# Patient Record
Sex: MALE | Race: WHITE | Employment: UNEMPLOYED | URBAN - METROPOLITAN AREA
[De-identification: names, ages, dates, MRNs, and addresses within clinical notes are randomized per-mention and may not be internally consistent; named-entity substitution may affect disease eponyms.]

---

## 2020-12-29 ENCOUNTER — OFFICE VISIT (OUTPATIENT)
Dept: URGENT CARE | Facility: URGENT CARE | Age: 55
End: 2020-12-29
Payer: COMMERCIAL

## 2020-12-29 ENCOUNTER — ANCILLARY PROCEDURE (OUTPATIENT)
Dept: GENERAL RADIOLOGY | Facility: CLINIC | Age: 55
End: 2020-12-29
Attending: PHYSICIAN ASSISTANT
Payer: COMMERCIAL

## 2020-12-29 VITALS
TEMPERATURE: 98 F | WEIGHT: 180 LBS | OXYGEN SATURATION: 96 % | HEART RATE: 93 BPM | HEIGHT: 69 IN | BODY MASS INDEX: 26.66 KG/M2 | DIASTOLIC BLOOD PRESSURE: 95 MMHG | RESPIRATION RATE: 20 BRPM | SYSTOLIC BLOOD PRESSURE: 158 MMHG

## 2020-12-29 DIAGNOSIS — R06.02 SOB (SHORTNESS OF BREATH): Primary | ICD-10-CM

## 2020-12-29 DIAGNOSIS — R06.02 SOB (SHORTNESS OF BREATH): ICD-10-CM

## 2020-12-29 PROCEDURE — 99203 OFFICE O/P NEW LOW 30 MIN: CPT | Performed by: PHYSICIAN ASSISTANT

## 2020-12-29 PROCEDURE — 71046 X-RAY EXAM CHEST 2 VIEWS: CPT | Performed by: RADIOLOGY

## 2020-12-29 PROCEDURE — 93000 ELECTROCARDIOGRAM COMPLETE: CPT | Performed by: PHYSICIAN ASSISTANT

## 2020-12-29 ASSESSMENT — MIFFLIN-ST. JEOR: SCORE: 1651.85

## 2020-12-30 NOTE — PROGRESS NOTES
"S: Sukh De Leon is a 55 year old male who presents to urgent care with concerns of shortness of breath.  He states it has been present for 3 days. Discribes it as a feeling like he is not getting satisfying breaths. He states he wonders if he needs oxygen.   It seems to come and go.  There is no chest pain associated with this sensation.  It does not interfere with ADLS and not worsened with activity. No orthopnea, pnd, BALES.  NO wheezing.  No history of asthma or RAD.  No dizziness or lightheadedness.  NO fevers or chills.     No cough.  There is no pleuritic chest discomfort.  No recent immobilization or trauma, no history of DVT or hypercoagulability. NO leg swelling.   No personal history of cardiac disease, htn, hyperlipidemia, diabetes. He is somewhat concerned about Covid 19 pandemic. He does have a history of anxiety per pt report but has not had sensation of feeling of sob associated with that in the past.  He has a pcp at another clinic and has an appointment with him in 1 week.      Past Medical History:   Diagnosis Date     Diverticulitis      Current Outpatient Medications   Medication     Omeprazole (PRILOSEC PO)     No current facility-administered medications for this visit.       O: BP (!) 158/95 (BP Location: Right arm, Patient Position: Sitting, Cuff Size: Adult Regular)   Pulse 93   Temp 98  F (36.7  C) (Temporal)   Resp 20   Ht 1.753 m (5' 9\")   Wt 81.6 kg (180 lb)   SpO2 96%   BMI 26.58 kg/m    nad appears well   Able to talk in full sentences.    Lungs CTA  Heart RRR, no murmurs or rubs no JVD, HJR   Abdomen: BS Active, soft ,nondistended, nontender to light or deep palpation. No rebound or peritoneal signs.    No masses or hsm.    MM moist, skin turger good.      CXR: per my independent reading negative for acute process, no PTX, effusion, cardiomegaly or infiltrate.    ECG: NSR without ectopy, no st elevation or depression.      A/P:    Encounter Diagnosis   Name Primary?     SOB " (shortness of breath) Yes     Reassured pt of normal CXR and ECG.  Given duration of sx being 3 days and nonexertional with normal ECG and no pain/pressure/discomfort in the chest thus low likelihood of ACS.  Cardiac exam currently normal without obvious murmurs or rubs and he has no associated chest pain, less likely related to pericarditis, PE.  There is no associated edema orthopnea, sx not worse with exertion, nor weight change or cardiomegaly on CXR thus less likely related to heart failure.  Pt vitals are all in the normal range including respirations and O2 sats. He is quite comfortable with normal conversation without any over dyspnea.  I would recommend follow-up with his primary care provider in the next week for recheck. If worsening, develops chest pain, leg swelling, fevers, he should go to the ED immediately.  Pt agreeable with plan.

## 2021-02-11 ENCOUNTER — NURSE TRIAGE (OUTPATIENT)
Dept: NURSING | Facility: CLINIC | Age: 56
End: 2021-02-11

## 2021-02-11 NOTE — TELEPHONE ENCOUNTER
Needs information for S.S.I. the 29th of December. I answered his questions for his paperwork. He denies further questions at this time. He wanted to know diagnosis, tests performed, plan of care.  Michelle Wade RN  [facility]    Additional Information    Negative: Nursing judgment    Negative: Nursing judgment    Negative: Nursing judgment    Negative: Nursing judgment    Information only question and nurse able to answer    Protocols used: NO PROTOCOL AVAILABLE - INFORMATION ONLY-A-[place]

## 2022-01-23 ENCOUNTER — NURSE TRIAGE (OUTPATIENT)
Dept: NURSING | Facility: CLINIC | Age: 57
End: 2022-01-23
Payer: COMMERCIAL

## 2022-01-23 NOTE — TELEPHONE ENCOUNTER
"    Reason for Disposition    Pimple, not a boil    Additional Information    Negative: [1] Widespread rash AND [2] bright red, sunburn-like AND [3] too weak to stand    Negative: Sounds like a life-threatening emergency to the triager    Negative: Painful lump or swelling at opening to anus (rectum)    Negative: Painful lump or swelling at opening to vagina (on labia)    Negative: Painful lump or swelling on scrotum    Negative: Impetigo suspected or diagnosed    Negative: Doesn't match the SYMPTOMS of a boil    Negative: MRSA, questions about (No boil or other skin lesion)    Negative: Widespread red rash    Negative: Black (necrotic) color or blisters develop in wound    Negative: Patient sounds very sick or weak to the triager    Negative: SEVERE pain (e.g., excruciating)    Negative: Red streak from area of infection    Negative: Fever > 100.4 F (38.0 C)    Negative: Boil > 2 inches across (> 5 cm; larger than a golf ball or ping pong ball)    Negative: [1] Boil > 1/2 inch across (> 12 mm; larger than a marble) AND [2] center is soft or pus colored    Negative: [1] Boil > 1/4 inch across (> 6 mm; larger than a pencil eraser) AND [2] on face    Negative: [1] Boil AND [2] diabetes mellitus or weak immune system (e.g., HIV positive, cancer chemo, splenectomy, organ transplant, chronic steroids)    Negative: Boil overlying a joint    Negative: 2 or more boils    Negative: [1] Spreading redness around the boil AND [2] no fever    Negative: Boil > 1/2 inch across (> 12 mm; larger than a marble)    Negative: [1] Boil AND [2] not improved > 3 days following CARE ADVICE    Negative: Boils are a recurrent problem for this patient (patient with no boil now)    Negative: Boils are a recurrent problem for this family (patient with no boil now)    Negative: Boil < 1/2 inch across (< 12 mm; smaller than a marble)    Answer Assessment - Initial Assessment Questions  1. APPEARANCE of BOIL: \"What does the boil look like?\"       " "Group of pimples  2. LOCATION: \"Where is the boil located?\"       Under beard  3. NUMBER: \"How many boils are there?\"       2-3 pimples  4. SIZE: \"How big is the boil?\" (e.g., inches, cm; compare to size of a coin or other object)      small  5. ONSET: \"When did the boil start?\"      More than a week  6. PAIN: \"Is there any pain?\" If so, ask: \"How bad is the pain?\"   (Scale 1-10; or mild, moderate, severe)      mild  7. FEVER: \"Do you have a fever?\" If so, ask: \"What is it, how was it measured, and when did it start?\"       no  8. SOURCE: \"Have you been around anyone with boils or other Staph infections?\" \"Have you ever had boils before?\"      3 month beard growth  9. OTHER SYMPTOMS: \"Do you have any other symptoms?\" (e.g., shaking chills, weakness, rash elsewhere on body)      no  10. PREGNANCY: \"Is there any chance you are pregnant?\" \"When was your last menstrual period?\"        no    Protocols used: BOIL (SKIN ABSCESS)-A-AH      "

## 2022-02-25 ENCOUNTER — NURSE TRIAGE (OUTPATIENT)
Dept: NURSING | Facility: CLINIC | Age: 57
End: 2022-02-25
Payer: COMMERCIAL

## 2022-02-25 NOTE — TELEPHONE ENCOUNTER
"S-(situation): boil    B-(background): Pt had shingles on his face last month. States that it now looks like a scab.    A-(assessment):     Single boil on his back above his tailbone, size 1/4 inch. Dark purple in color, he states it used to have pus in it but per his , it looks \"dark and no whitish in the middle.\" This is present for 2-3 days.  No pain. States that it is not recurrent.  No fever. No red streak. No itching      R-(recommendations): home care. Advised to clean wound with soap and water BID, then apply OTC antibiotic ointment TID x 7 days. If boil appears to get worse, or if it spreads, schedule an appointment with PCP.    He also asks about shingles shot. FNA advised that as long as shingles rash have cleared up, can get shingles shot (per CDC)    Anni Ballesteros RN/[facility]          Reason for Disposition    Red tender bump or lump < 1/2 inch across (< 12 mm; smaller than a marble)    Additional Information    Negative: Widespread rash and bright red, sunburn-like and too weak to stand    Negative: Sounds like a life-threatening emergency to the triager    Negative: Widespread red rash    Negative: Black (necrotic) color or blisters develop in wound    Negative: Patient sounds very sick or weak to the triager    Negative: SEVERE pain (e.g., excruciating)    Negative: Red streak from area of infection    Negative: Fever > 100.4 F (38.0 C)    Negative: Boil > 2 inches across (> 5 cm; larger than a golf ball or ping pong ball)    Negative: Boil > 1/2 inch across (> 12 mm; larger than a marble) and center is soft or pus colored    Negative: Spreading redness around the boil and no fever    Negative: Boil and diabetes mellitus or weak immune system (e.g., HIV positive, cancer chemo, splenectomy, organ transplant, chronic steroids)    Negative: Boil > 1/4 inch across (> 6 mm; larger than a pencil eraser) and on face    Negative: Boil overlying a joint    Negative: 2 or more boils    " Negative: Patient wants to be seen    Negative: Boil > 1/2 inch across (> 12 mm; larger than a marble)    Negative: Boil and not improved > 3 days following CARE ADVICE    Negative: Boils are a recurrent problem for this patient (patient with no boil now)    Negative: Boils are a recurrent problem for this family (patient with no boil now)    Protocols used: BOIL (SKIN ABSCESS)-A-OH

## 2023-05-20 ENCOUNTER — OFFICE VISIT (OUTPATIENT)
Dept: URGENT CARE | Facility: URGENT CARE | Age: 58
End: 2023-05-20
Payer: COMMERCIAL

## 2023-05-20 ENCOUNTER — ANCILLARY PROCEDURE (OUTPATIENT)
Dept: GENERAL RADIOLOGY | Facility: CLINIC | Age: 58
End: 2023-05-20
Attending: FAMILY MEDICINE
Payer: COMMERCIAL

## 2023-05-20 VITALS
RESPIRATION RATE: 18 BRPM | BODY MASS INDEX: 30.89 KG/M2 | OXYGEN SATURATION: 97 % | TEMPERATURE: 98.8 F | SYSTOLIC BLOOD PRESSURE: 165 MMHG | DIASTOLIC BLOOD PRESSURE: 99 MMHG | HEART RATE: 99 BPM | WEIGHT: 209.2 LBS

## 2023-05-20 DIAGNOSIS — R03.0 ELEVATED BP WITHOUT DIAGNOSIS OF HYPERTENSION: ICD-10-CM

## 2023-05-20 DIAGNOSIS — M10.9 ACUTE GOUT INVOLVING TOE OF LEFT FOOT, UNSPECIFIED CAUSE: ICD-10-CM

## 2023-05-20 DIAGNOSIS — M25.532 LEFT WRIST PAIN: ICD-10-CM

## 2023-05-20 DIAGNOSIS — M79.675 PAIN OF TOE OF LEFT FOOT: Primary | ICD-10-CM

## 2023-05-20 LAB — URATE SERPL-MCNC: 8.6 MG/DL (ref 3.4–7)

## 2023-05-20 PROCEDURE — 36415 COLL VENOUS BLD VENIPUNCTURE: CPT | Performed by: FAMILY MEDICINE

## 2023-05-20 PROCEDURE — 73660 X-RAY EXAM OF TOE(S): CPT | Mod: TC | Performed by: RADIOLOGY

## 2023-05-20 PROCEDURE — 84550 ASSAY OF BLOOD/URIC ACID: CPT | Performed by: FAMILY MEDICINE

## 2023-05-20 PROCEDURE — 73110 X-RAY EXAM OF WRIST: CPT | Mod: TC | Performed by: RADIOLOGY

## 2023-05-20 PROCEDURE — 99203 OFFICE O/P NEW LOW 30 MIN: CPT | Performed by: FAMILY MEDICINE

## 2023-05-20 RX ORDER — ROSUVASTATIN CALCIUM 20 MG/1
20 TABLET, COATED ORAL DAILY
COMMUNITY
Start: 2023-05-15

## 2023-05-20 RX ORDER — METHYLPREDNISOLONE 4 MG
TABLET, DOSE PACK ORAL
Qty: 21 TABLET | Refills: 0 | Status: SHIPPED | OUTPATIENT
Start: 2023-05-20

## 2023-05-20 NOTE — PROGRESS NOTES
Chief Complaint   Patient presents with     Musculoskeletal Problem     Patient presents with complain of wrist and toe pain.      Sukh was seen today for musculoskeletal problem.    Diagnoses and all orders for this visit:    Pain of toe of left foot  -     Uric acid; Future  -     XR Toe Left G/E 2 Views  -     Uric acid  -     methylPREDNISolone (MEDROL DOSEPAK) 4 MG tablet therapy pack; Follow Package Directions    Left wrist pain  -     XR Wrist Left G/E 3 Views    Elevated BP without diagnosis of hypertension    Acute gout involving toe of left foot, unspecified cause      Results for orders placed or performed in visit on 05/20/23   XR Toe Left G/E 2 Views     Status: None    Narrative    EXAM: XR TOE LEFT G/E 2 VIEWS  LOCATION: [facility]  DATE/TIME: 5/20/2023 3:33 PM CDT    INDICATION:  Pain of toe of left foot  COMPARISON: None.      Impression    IMPRESSION: Degenerative change at the first MTP joint and IP joint of the great toe. No evidence for fracture or dislocation.   XR Wrist Left G/E 3 Views     Status: None    Narrative    EXAM: XR WRIST LEFT G/E 3 VIEWS  LOCATION: [facility]  DATE/TIME: 5/20/2023 3:33 PM CDT    INDICATION:  Left wrist pain  COMPARISON: None.      Impression    IMPRESSION: The left wrist is negative for fracture. Normal carpal alignment.   Uric acid     Status: Abnormal   Result Value Ref Range    Uric Acid 8.6 (H) 3.4 - 7.0 mg/dL       With existing kidney function would not treat him with any NSAIDs  X-ray  Did not show any fracture  His blood pressure was noted to be elevated   reviewed previous notes per cardiologist note patient was started on chlorthalidone on Crestor not sure if he is taking it  Patient has been taking chlorthalidone and Crestor discussed with patient about the elevated blood pressure number  Uric acid was noted to be elevated   Tried calling patient no answer send message to urgent care nurse to call  patient and notify about the results tomorrow   advised to follow-up with primary for follow-up on his symptoms.    Sukh De Leon is a 58 year old male who presents with a chief complaint of left toe(s) great pain, tenderness and redness.  Symptoms began 1 day(s) ago, are moderate and sudden onset patient not sure if he stubbed his toe.  Denies any ankle pain also has been having concerns with left wrist discomfort which he  describes as tenderness but denies any fall or injury patient thinks he has thinning of the bone.  Context:    Toe Pain exacerbated by walking Relieved by rest.  He treated it initially with no therapy. This is the first time this type of pain  has occurred to this patient.     Past Medical History:   Diagnosis Date     Diverticulitis      Current Outpatient Medications   Medication Sig Dispense Refill     methylPREDNISolone (MEDROL DOSEPAK) 4 MG tablet therapy pack Follow Package Directions 21 tablet 0     Omeprazole (PRILOSEC PO) Take 20 mg by mouth       rosuvastatin (CRESTOR) 20 MG tablet Take 20 mg by mouth daily       Social History     Tobacco Use     Smoking status: Never     Smokeless tobacco: Never   Vaping Use     Vaping status: Not on file   Substance Use Topics     Alcohol use: No       ROS:  10 point ROS of systems including Constitutional, Eyes, Respiratory, Cardiovascular, Gastroenterology, Genitourinary, Psychiatric were all negative except for pertinent positives noted in my HPI           EXAM:   BP (!) 165/99 (BP Location: Right arm, Patient Position: Sitting, Cuff Size: Adult Regular)   Pulse 99   Temp 98.8  F (37.1  C) (Oral)   Resp 18   Wt 94.9 kg (209 lb 3.2 oz)   SpO2 97%   BMI 30.89 kg/m    M/S Exam:left wrist there is no point tenderness noted range of motion of the wrist within normal limits and toe(s) great erythema and tenderness to palpation over the metatarsophalangeal joint of the first toe  GENERAL APPEARANCE: healthy, alert and no distress  EXTREMITIES:  peripheral pulses normal  SKIN: no suspicious lesions or rashes  NEURO: Normal strength and tone, sensory exam grossly normal, mentation intact and speech normal    Tonya Mai MD

## 2023-05-29 ENCOUNTER — HOSPITAL ENCOUNTER (EMERGENCY)
Facility: CLINIC | Age: 58
Discharge: HOME | End: 2023-05-29
Attending: EMERGENCY MEDICINE | Admitting: EMERGENCY MEDICINE
Payer: COMMERCIAL

## 2023-05-29 VITALS
TEMPERATURE: 98.6 F | WEIGHT: 211 LBS | OXYGEN SATURATION: 96 % | RESPIRATION RATE: 18 BRPM | DIASTOLIC BLOOD PRESSURE: 88 MMHG | HEART RATE: 68 BPM | HEIGHT: 68 IN | BODY MASS INDEX: 31.98 KG/M2 | SYSTOLIC BLOOD PRESSURE: 121 MMHG

## 2023-05-29 DIAGNOSIS — M25.572 ARTHRALGIA OF LEFT FOOT: ICD-10-CM

## 2023-05-29 LAB
ANION GAP SERPL CALCULATED.3IONS-SCNC: 9 MMOL/L (ref 7–15)
BUN SERPL-MCNC: 13.5 MG/DL (ref 6–20)
CALCIUM SERPL-MCNC: 8.8 MG/DL (ref 8.6–10)
CHLORIDE SERPL-SCNC: 101 MMOL/L (ref 98–107)
CREAT SERPL-MCNC: 1.04 MG/DL (ref 0.67–1.17)
CRP SERPL-MCNC: 29.51 MG/L
DEPRECATED HCO3 PLAS-SCNC: 29 MMOL/L (ref 22–29)
ERYTHROCYTE [DISTWIDTH] IN BLOOD BY AUTOMATED COUNT: 13.5 % (ref 10–15)
GFR SERPL CREATININE-BSD FRML MDRD: 84 ML/MIN/1.73M2
GLUCOSE SERPL-MCNC: 129 MG/DL (ref 70–99)
HCT VFR BLD AUTO: 39.1 % (ref 40–53)
HGB BLD-MCNC: 13.4 G/DL (ref 13.3–17.7)
HOLD SPECIMEN: NORMAL
MCH RBC QN AUTO: 32.4 PG (ref 26.5–33)
MCHC RBC AUTO-ENTMCNC: 34.3 G/DL (ref 31.5–36.5)
MCV RBC AUTO: 94 FL (ref 78–100)
PLATELET # BLD AUTO: 147 10E3/UL (ref 150–450)
POTASSIUM SERPL-SCNC: 3.6 MMOL/L (ref 3.4–5.3)
RBC # BLD AUTO: 4.14 10E6/UL (ref 4.4–5.9)
SODIUM SERPL-SCNC: 139 MMOL/L (ref 136–145)
WBC # BLD AUTO: 8.3 10E3/UL (ref 4–11)

## 2023-05-29 PROCEDURE — 85027 COMPLETE CBC AUTOMATED: CPT | Performed by: EMERGENCY MEDICINE

## 2023-05-29 PROCEDURE — 86140 C-REACTIVE PROTEIN: CPT | Performed by: EMERGENCY MEDICINE

## 2023-05-29 PROCEDURE — 80048 BASIC METABOLIC PNL TOTAL CA: CPT | Performed by: EMERGENCY MEDICINE

## 2023-05-29 PROCEDURE — 36415 COLL VENOUS BLD VENIPUNCTURE: CPT | Performed by: EMERGENCY MEDICINE

## 2023-05-29 PROCEDURE — 99284 EMERGENCY DEPT VISIT MOD MDM: CPT

## 2023-05-29 PROCEDURE — 250N000013 HC RX MED GY IP 250 OP 250 PS 637: Performed by: EMERGENCY MEDICINE

## 2023-05-29 RX ORDER — OXYCODONE HYDROCHLORIDE 5 MG/1
5 TABLET ORAL EVERY 6 HOURS PRN
Qty: 12 TABLET | Refills: 0 | Status: SHIPPED | OUTPATIENT
Start: 2023-05-29 | End: 2023-06-01

## 2023-05-29 RX ORDER — PREDNISONE 10 MG/1
TABLET ORAL
Qty: 21 TABLET | Refills: 0 | Status: SHIPPED | OUTPATIENT
Start: 2023-05-29

## 2023-05-29 RX ORDER — OXYCODONE HYDROCHLORIDE 5 MG/1
5 TABLET ORAL ONCE
Status: COMPLETED | OUTPATIENT
Start: 2023-05-29 | End: 2023-05-29

## 2023-05-29 RX ADMIN — OXYCODONE HYDROCHLORIDE 5 MG: 5 TABLET ORAL at 09:43

## 2023-05-29 ASSESSMENT — ACTIVITIES OF DAILY LIVING (ADL)
ADLS_ACUITY_SCORE: 35
ADLS_ACUITY_SCORE: 35

## 2023-05-29 NOTE — ED TRIAGE NOTES
Pt reports was seen on 5/20 for left great toe pain. Pt send with Steroid dose pack. Pain still present.      Triage Assessment     Row Name 05/29/23 0843       Cardiac WDL    Cardiac WDL WDL       Peripheral/Neurovascular WDL    Peripheral Neurovascular WDL --  Left great toe pain       Cognitive/Neuro/Behavioral WDL    Cognitive/Neuro/Behavioral WDL WDL

## 2023-05-29 NOTE — ED PROVIDER NOTES
"  History     Chief Complaint:  Foot Pain       HPI   Sukh De Leon is a 58 year old male who presents with foot pain. The patient reports that he was seen on 5/20 for left great toe pain near the joint as well as associated redness. He was prescribed steroids that ended 3 days ago, and he was given allopurinol 2 days ago. He explains that the steroids helped, but his pain returned suddenly in the evening after taking his first dose of allopurinol. Sukh is not diabetic, and he has not suffered any trauma to the foot. The patient denies fever, chills, and diaphoresis.      Independent Historian:   None - Patient Only    Review of External Notes:   I reviewed the patient's telephone encounter from 5/26/23 and his office visit from 5/20/23       Medications:    Allopurinol  Medrol  Prilosec  Crestor    Past Medical History:    Anxiety  HTN  IBS  Prediabetes  Diverticulitis      Physical Exam     Patient Vitals for the past 24 hrs:   BP Temp Temp src Pulse Resp SpO2 Height Weight   05/29/23 1116 121/88 -- -- 68 18 96 % -- --   05/29/23 0847 (!) 143/88 98.6  F (37  C) Temporal 91 16 97 % 1.727 m (5' 8\") 95.7 kg (211 lb)      Physical Exam  SKIN:  Warm, dry.  Erythema of the left distal dorsal foot including at the first MTP joint.  No cutaneous breakdown or ulceration.  No ecchymoses.  HEMATOLOGIC/IMMUNOLOGIC/LYMPHATIC: No pitting edema of left lower limb.  No lymphangitic streaking of left lower limb.  CARDIOVASCULAR:  Regular rate and rhythm.  RESPIRATORY:  No respiratory distress, breath sounds equal and normal.  MUSCULOSKELETAL: Left  at the first MTP joint and foot pain also exacerbated by passive range of motion of the left great toe.  NEUROLOGIC:  Alert, conversant.  PSYCHIATRIC:  Normal mood.    Emergency Department Course     Laboratory:  Labs Ordered and Resulted from Time of ED Arrival to Time of ED Departure   CBC WITH PLATELETS - Abnormal       Result Value    WBC Count 8.3      RBC Count 4.14 " (*)     Hemoglobin 13.4      Hematocrit 39.1 (*)     MCV 94      MCH 32.4      MCHC 34.3      RDW 13.5      Platelet Count 147 (*)    BASIC METABOLIC PANEL - Abnormal    Sodium 139      Potassium 3.6      Chloride 101      Carbon Dioxide (CO2) 29      Anion Gap 9      Urea Nitrogen 13.5      Creatinine 1.04      Calcium 8.8      Glucose 129 (*)     GFR Estimate 84     CRP INFLAMMATION - Abnormal    CRP Inflammation 29.51 (*)         Emergency Department Course & Assessments:        Interventions:  Medications   oxyCODONE (ROXICODONE) tablet 5 mg (5 mg Oral $Given 5/29/23 0943)        Assessments:  0910 I obtained history and examined the patient as noted above    Independent Interpretation (X-rays, CTs, rhythm strip):  None    Consultations/Discussion of Management or Tests:  None        Social Determinants of Health affecting care:   None    Disposition:  The patient was discharged to home.     Impression & Plan      Medical Decision Making:  This patient is experiencing rebounding pain at the left distal foot focused at the first MTP joint.  Likely suffering gout.  Afebrile.  Inflammatory markers largely reassuring.  CRP elevated which could be expected with gout.  Given the classic location of inflammation for gout and the patient's recent good response to a Medrol Dosepak as an anti-inflammatory for gout I suspect this is gout as opposed to septic joint.  Opted to prescribe a prednisone taper and advised to hold allopurinol while taking this and to resume after he finishes the taper.  Otherwise prescribed oxycodone.  Follow-up advised with orthopedics or podiatry or just primary care.    Diagnosis:    ICD-10-CM    1. Arthralgia of left foot  M25.572            Discharge Medications:  Discharge Medication List as of 5/29/2023 11:10 AM      START taking these medications    Details   oxyCODONE (ROXICODONE) 5 MG tablet Take 1 tablet (5 mg) by mouth every 6 hours as needed for pain, Disp-12 tablet, R-0, Local Print       predniSONE (DELTASONE) 10 MG tablet 40 mg once a day for 3 days, then 20 mg once a day for 3 days, then 10 mg once a day for 2 days, then 5 mg once a day for 2 days, Disp-21 tablet, R-0, Local Print              Scribe Disclosure:  FINESSE, Thien Srinivasan, am serving as a scribe at 9:01 AM on 5/29/2023 to document services personally performed by Ten Ni MD based on my observations and the provider's statements to me.   5/29/2023   Ten Ni MD Moe, James Thomas, MD  05/29/23 0460

## 2023-07-03 ENCOUNTER — OFFICE VISIT (OUTPATIENT)
Dept: URGENT CARE | Facility: URGENT CARE | Age: 58
End: 2023-07-03
Payer: COMMERCIAL

## 2023-07-03 VITALS
TEMPERATURE: 97.8 F | OXYGEN SATURATION: 97 % | SYSTOLIC BLOOD PRESSURE: 138 MMHG | DIASTOLIC BLOOD PRESSURE: 91 MMHG | HEART RATE: 78 BPM

## 2023-07-03 DIAGNOSIS — M10.9 ACUTE GOUT INVOLVING TOE OF LEFT FOOT, UNSPECIFIED CAUSE: Primary | ICD-10-CM

## 2023-07-03 PROCEDURE — 99214 OFFICE O/P EST MOD 30 MIN: CPT | Performed by: PHYSICIAN ASSISTANT

## 2023-07-03 RX ORDER — PREDNISONE 20 MG/1
TABLET ORAL
Qty: 20 TABLET | Refills: 0 | Status: SHIPPED | OUTPATIENT
Start: 2023-07-03

## 2023-07-03 NOTE — PROGRESS NOTES
Assessment & Plan      1. Acute gout involving toe of left foot, unspecified cause      - predniSONE (DELTASONE) 20 MG tablet; Take 3 tabs by mouth daily x 3 days, then 2 tabs daily x 3 days, then 1 tab daily x 3 days, then 1/2 tab daily x 3 days.  Dispense: 20 tablet; Refill: 0       Patient Instructions   The following can minimize chances of gout flare ups    Losing weight if overweight    Cut down on the following food and drinks:  Red meat and seafood, alcohol, sodas and sugary snacks        Return for Follow up, with PCP.    At the end of the encounter, I discussed results, diagnosis, medications. Discussed red flags for immediate return to clinic/ER, as well as indications for follow up if no improvement. Patient understood and agreed to plan. Patient was stable for discharge.    Leslye Luz is a 58 year old male who presents to clinic today for the following health issues:  Chief Complaint   Patient presents with     Urgent Care     Gout left big toe      HPI  Patient reports acute gout flareup on the left big toe since last night.  Patient was supposed to be on allopurinol 100 mg.  He was seen by his primary care provider on 06/10/2023.  He was advised to start allopurinol.  Patient stopped taking the medication soon after because of an allergic reaction.  He notes he felt like his throat was swelling.  He has been treated with prednisone in the past.  He notes prednisone has helped with the acute flareup.  Denies fever chills.      Review of Systems    Problem List:  There are no relevant problems documented for this patient.      Past Medical History:   Diagnosis Date     Diverticulitis        Social History     Tobacco Use     Smoking status: Never     Smokeless tobacco: Never   Substance Use Topics     Alcohol use: No           Objective    BP (!) 138/91 (BP Location: Left arm, Patient Position: Sitting, Cuff Size: Adult Regular)   Pulse 78   Temp 97.8  F (36.6  C) (Tympanic)   SpO2 97%    Physical Exam  Constitutional:       Appearance: Normal appearance.   HENT:      Head: Normocephalic.   Cardiovascular:      Rate and Rhythm: Normal rate and regular rhythm.   Pulmonary:      Effort: Pulmonary effort is normal.      Breath sounds: Normal breath sounds.   Musculoskeletal:      Cervical back: Normal range of motion and neck supple.        Feet:    Feet:      Left foot:      Skin integrity: Erythema and warmth present.      Comments: Erythema, swelling and warmth on the MTP joint, left big toe  Lymphadenopathy:      Head:      Right side of head: No submental, submandibular or tonsillar adenopathy.      Left side of head: No submental, submandibular or tonsillar adenopathy.   Skin:     General: Skin is warm and dry.      Capillary Refill: Capillary refill takes 2 to 3 seconds.      Findings: No rash.   Neurological:      Mental Status: He is alert and oriented to person, place, and time.             Media Information      Document Information    Other:  Photograph      07/03/2023 6:06 PM   Attached To:   Office Visit on 7/3/23 with Kimmy Schumacher PA-C     Source Information    Kimmy Schumacher PA-C   Urgent Care         Kimmy Schumacher PA-C

## 2023-07-03 NOTE — PATIENT INSTRUCTIONS
The following can minimize chances of gout flare ups    Losing weight if overweight    Cut down on the following food and drinks:  Red meat and seafood, alcohol, sodas and sugary snacks

## 2025-03-30 ENCOUNTER — ANCILLARY PROCEDURE (OUTPATIENT)
Dept: GENERAL RADIOLOGY | Facility: CLINIC | Age: 60
End: 2025-03-30
Attending: FAMILY MEDICINE
Payer: COMMERCIAL

## 2025-03-30 ENCOUNTER — OFFICE VISIT (OUTPATIENT)
Dept: URGENT CARE | Facility: URGENT CARE | Age: 60
End: 2025-03-30
Payer: COMMERCIAL

## 2025-03-30 VITALS
BODY MASS INDEX: 31.32 KG/M2 | WEIGHT: 206 LBS | HEART RATE: 74 BPM | RESPIRATION RATE: 16 BRPM | SYSTOLIC BLOOD PRESSURE: 157 MMHG | OXYGEN SATURATION: 96 % | DIASTOLIC BLOOD PRESSURE: 77 MMHG

## 2025-03-30 DIAGNOSIS — S69.91XA HAND INJURY, RIGHT, INITIAL ENCOUNTER: ICD-10-CM

## 2025-03-30 DIAGNOSIS — S60.221A CONTUSION OF RIGHT HAND, INITIAL ENCOUNTER: Primary | ICD-10-CM

## 2025-03-30 PROBLEM — I10 ESSENTIAL HYPERTENSION: Status: ACTIVE | Noted: 2021-12-06

## 2025-03-30 PROBLEM — I25.9 CHRONIC ISCHEMIC HEART DISEASE: Status: ACTIVE | Noted: 2023-07-25

## 2025-03-30 PROBLEM — R73.03 PREDIABETES: Chronic | Status: ACTIVE | Noted: 2024-01-08

## 2025-03-30 PROBLEM — M1A.09X0 IDIOPATHIC CHRONIC GOUT OF MULTIPLE SITES WITHOUT TOPHUS: Status: ACTIVE | Noted: 2025-03-30

## 2025-03-30 PROBLEM — Z95.1 S/P CABG (CORONARY ARTERY BYPASS GRAFT): Status: ACTIVE | Noted: 2023-08-08

## 2025-03-30 PROCEDURE — 3078F DIAST BP <80 MM HG: CPT | Performed by: FAMILY MEDICINE

## 2025-03-30 PROCEDURE — 73130 X-RAY EXAM OF HAND: CPT | Mod: TC | Performed by: RADIOLOGY

## 2025-03-30 PROCEDURE — 3077F SYST BP >= 140 MM HG: CPT | Performed by: FAMILY MEDICINE

## 2025-03-30 PROCEDURE — 99215 OFFICE O/P EST HI 40 MIN: CPT | Performed by: FAMILY MEDICINE

## 2025-03-30 RX ORDER — CLONAZEPAM 1 MG/1
TABLET ORAL
COMMUNITY

## 2025-03-30 RX ORDER — OLANZAPINE 10 MG/1
1 TABLET ORAL
COMMUNITY
Start: 2024-11-08

## 2025-03-30 RX ORDER — CITALOPRAM HYDROBROMIDE 20 MG/1
1 TABLET ORAL
COMMUNITY
Start: 2025-01-27

## 2025-03-30 RX ORDER — METOPROLOL SUCCINATE 100 MG/1
100 TABLET, EXTENDED RELEASE ORAL DAILY
COMMUNITY
Start: 2024-10-14

## 2025-03-30 RX ORDER — ASPIRIN 81 MG/1
81 TABLET, CHEWABLE ORAL
COMMUNITY
Start: 2023-08-28

## 2025-03-30 RX ORDER — ACETAMINOPHEN 500 MG
1000 TABLET ORAL EVERY 8 HOURS PRN
Qty: 30 TABLET | Refills: 0 | Status: SHIPPED | OUTPATIENT
Start: 2025-03-30

## 2025-03-30 RX ORDER — HYDROXYZINE HYDROCHLORIDE 25 MG/1
25-50 TABLET, FILM COATED ORAL
COMMUNITY
Start: 2025-02-07

## 2025-03-30 RX ORDER — FUROSEMIDE 20 MG/1
20 TABLET ORAL DAILY
COMMUNITY
Start: 2024-11-05 | End: 2025-11-05

## 2025-03-30 RX ORDER — ALLOPURINOL 300 MG/1
1 TABLET ORAL DAILY
COMMUNITY
Start: 2025-03-21 | End: 2026-03-21

## 2025-03-30 RX ORDER — LOSARTAN POTASSIUM 50 MG/1
50 TABLET ORAL DAILY
COMMUNITY
Start: 2024-09-23 | End: 2025-09-23

## 2025-03-30 RX ORDER — LOSARTAN POTASSIUM 25 MG/1
TABLET ORAL
COMMUNITY
Start: 2024-08-06 | End: 2025-03-30

## 2025-03-30 RX ORDER — COLCHICINE 0.6 MG/1
TABLET ORAL
COMMUNITY
Start: 2024-04-29

## 2025-03-30 NOTE — PROGRESS NOTES
ASSESSMENT/PLAN:      ICD-10-CM    1. Contusion of right hand, initial encounter  S60.221A acetaminophen (TYLENOL) 500 MG tablet    Significant bruising due to daily ASA,pain / swelling - fifth MCP, fifth metacarpal and  soft tissue tissue of ulnar side of the hand-no fx -see AVS for plan      2. Hand injury, right, initial encounter  S69.91XA XR Hand Right G/E 3 Views    Crush injury of hand between large cardboard box pt carrying and  the corner of a wall of his home -X-ray neg for fx, this provider Ace wrapped hand for comfort        See Patient instructions/ AVS summary for additional recommendations reviewed with patient during this visit.        Patient Instructions     Keep hand elevated, ice to area to help with pain/swelling/bruising      For pain    Acetaminophen (Tylenol ) 1000 mg 3 times a day for the next 3 to 5 days until pain resolves-maximum dose of acetaminophen (tylenol)  is 3000 mg in 24-hours     Do not use aspirin for the hand pain to prevent worsening bruising/bleeding under the skin that can cause worsening swelling and pain.       to ER if symptoms worsen     Follow up with your primary care provider or clinic in about 2-3 days  if your symptoms do not improve                Reviewed medication instructions and side effects. Follow up if experiences side effects.     I reviewed supportive care, otc meds to use if needed, expected course, and signs of concern.  Follow up as needed or if he does not improve within  1-2 days or if worsens in any way.  Reviewed red flag symptoms and is to go to the ER if experiences any of these.     The use of [email or web address] dictation services may have been used to construct the content in this note; any grammatical or spelling errors are non-intentional. Please contact the author of this note directly if you are in need of any clarification.      On the day of the encounter, time spend on chart review, patient visit, review of testing, documentation was 40  minutes              Patient presents with:  Hand Injury: R hand injury after moving now there is a bruise        Subjective     Sukh De Leon is a 60 year old male who presents to clinic today for the following health issues:    HPI       Musculoskeletal problem/pain    Duration: yesterday   Description  Location: right hand     Accompanying signs and symptoms: swelling and bruising   History  Previous similar problem: no   Previous evaluation:  none  Precipitating or alleviating factors:  Trauma or overuse: YES- moving a large cardboard box and crushed lateral right hand between box and corner of wall inside his home  Aggravating factors include: lifting, overuse, and use of hand   Therapies tried and outcome: ice./aspirin some relief of pain and swelling       Past Medical History:   Diagnosis Date    Diverticulitis      Social History     Tobacco Use    Smoking status: Never    Smokeless tobacco: Never   Substance Use Topics    Alcohol use: No       Current Outpatient Medications   Medication Sig Dispense Refill    acetaminophen (TYLENOL) 500 MG tablet Take 2 tablets (1,000 mg) by mouth every 8 hours as needed for mild pain or pain. 30 tablet 0    allopurinol (ZYLOPRIM) 300 MG tablet Take 1 tablet by mouth daily.      aspirin (ASA) 81 MG chewable tablet Take 81 mg by mouth.      citalopram (CELEXA) 20 MG tablet Take 1 tablet by mouth daily at 2 pm.      clonazePAM (KLONOPIN) 1 MG tablet TAKE 1/2 TO 1 TABLET BY MOUTH DAILY AS NEEDED FOR ANXIETY      colchicine (COLCRYS) 0.6 MG tablet During flare: on day one, take colchicine 2 tablets (1.2 mg) then 1 tablet (0.6 mg) an hour later. The next day start taking 1 tablet (0.6 mg) twice daily until symptoms have resolved for 2 days.      furosemide (LASIX) 20 MG tablet Take 20 mg by mouth daily.      hydrOXYzine HCl (ATARAX) 25 MG tablet Take 25-50 mg by mouth.      losartan (COZAAR) 50 MG tablet Take 50 mg by mouth daily.      metoprolol succinate ER (TOPROL XL) 100  "MG 24 hr tablet Take 100 mg by mouth daily.      OLANZapine (ZYPREXA) 10 MG tablet Take 1 tablet by mouth daily at 2 pm.      Omeprazole (PRILOSEC PO) Take 20 mg by mouth      rosuvastatin (CRESTOR) 20 MG tablet Take 20 mg by mouth daily       Allergies   Allergen Reactions    Amlodipine Hives    Doxycycline Other (See Comments)     Worsens photosensitivity, causing headaches    Erythromycin Nausea and Vomiting     PN: LW Reaction: GI upset    Other [No Clinical Screening - See Comments]      \"i can't do any antibiotics. i get sick with n/v \"     Penicillins Nausea and Vomiting    Sulfa Antibiotics Nausea and Vomiting             ROS are negative, except as otherwise noted HPI      Objective    BP (!) 157/77   Pulse 74   Resp 16   Wt 93.4 kg (206 lb)   SpO2 96%   BMI 31.32 kg/m    Body mass index is 31.32 kg/m .  Physical Exam   GENERAL: alert and no distress  MS: Right hand-dorsum of -significant bruising, tenderness swelling over the fifth MCP, mid fifth metacarpal and the soft tissue of the lateral hand-ligaments intact no pain with range of motion of the DIP ,PIP of the fifth finger, mild swelling of the fifth finger, pain with range of motion of the MCP  NEURO: Normal strength and tone, mentation intact and speech normal, normal gait       Diagnostic Test Results:  Labs reviewed in Epic  Results for orders placed or performed in visit on 03/30/25   XR Hand Right G/E 3 Views     Status: None    Narrative    EXAM: XR HAND RIGHT G/E 3 VIEWS  LOCATION: [facility]  DATE: 3/30/2025    INDICATION: Ulnar hand pain after injury.  COMPARISON: None.      Impression    IMPRESSION: Normal joint spaces and alignment. No fracture.                     "

## 2025-03-30 NOTE — PATIENT INSTRUCTIONS
Keep hand elevated, ice to area to help with pain/swelling/bruising      For pain    Acetaminophen (Tylenol ) 1000 mg 3 times a day for the next 3 to 5 days until pain resolves-maximum dose of acetaminophen (tylenol)  is 3000 mg in 24-hours     Do not use aspirin for the hand pain to prevent worsening bruising/bleeding under the skin that can cause worsening swelling and pain.       to ER if symptoms worsen     Follow up with your primary care provider or clinic in about 2-3 days  if your symptoms do not improve